# Patient Record
Sex: MALE | Race: WHITE
[De-identification: names, ages, dates, MRNs, and addresses within clinical notes are randomized per-mention and may not be internally consistent; named-entity substitution may affect disease eponyms.]

---

## 2018-06-09 ENCOUNTER — HOSPITAL ENCOUNTER (OUTPATIENT)
Dept: HOSPITAL 17 - NEPE | Age: 63
Setting detail: OBSERVATION
LOS: 2 days | Discharge: HOME | End: 2018-06-11
Attending: HOSPITALIST | Admitting: HOSPITALIST
Payer: MEDICAID

## 2018-06-09 VITALS
SYSTOLIC BLOOD PRESSURE: 189 MMHG | HEART RATE: 82 BPM | RESPIRATION RATE: 17 BRPM | DIASTOLIC BLOOD PRESSURE: 88 MMHG | OXYGEN SATURATION: 97 %

## 2018-06-09 VITALS — HEIGHT: 71 IN | BODY MASS INDEX: 44.1 KG/M2 | WEIGHT: 315 LBS

## 2018-06-09 VITALS
SYSTOLIC BLOOD PRESSURE: 161 MMHG | HEART RATE: 70 BPM | TEMPERATURE: 97.8 F | OXYGEN SATURATION: 95 % | RESPIRATION RATE: 19 BRPM | DIASTOLIC BLOOD PRESSURE: 83 MMHG

## 2018-06-09 VITALS
TEMPERATURE: 98.3 F | RESPIRATION RATE: 16 BRPM | HEART RATE: 83 BPM | OXYGEN SATURATION: 98 % | DIASTOLIC BLOOD PRESSURE: 76 MMHG | SYSTOLIC BLOOD PRESSURE: 141 MMHG

## 2018-06-09 VITALS
OXYGEN SATURATION: 95 % | HEART RATE: 77 BPM | RESPIRATION RATE: 20 BRPM | SYSTOLIC BLOOD PRESSURE: 162 MMHG | DIASTOLIC BLOOD PRESSURE: 87 MMHG

## 2018-06-09 DIAGNOSIS — E66.01: ICD-10-CM

## 2018-06-09 DIAGNOSIS — F12.90: ICD-10-CM

## 2018-06-09 DIAGNOSIS — M19.90: ICD-10-CM

## 2018-06-09 DIAGNOSIS — Z86.718: ICD-10-CM

## 2018-06-09 DIAGNOSIS — L03.115: Primary | ICD-10-CM

## 2018-06-09 DIAGNOSIS — I87.8: ICD-10-CM

## 2018-06-09 DIAGNOSIS — J98.11: ICD-10-CM

## 2018-06-09 DIAGNOSIS — I10: ICD-10-CM

## 2018-06-09 DIAGNOSIS — Z86.711: ICD-10-CM

## 2018-06-09 DIAGNOSIS — R60.0: ICD-10-CM

## 2018-06-09 DIAGNOSIS — L03.116: ICD-10-CM

## 2018-06-09 DIAGNOSIS — E11.9: ICD-10-CM

## 2018-06-09 DIAGNOSIS — Z59.0: ICD-10-CM

## 2018-06-09 DIAGNOSIS — R19.7: ICD-10-CM

## 2018-06-09 DIAGNOSIS — F41.9: ICD-10-CM

## 2018-06-09 DIAGNOSIS — Z79.01: ICD-10-CM

## 2018-06-09 DIAGNOSIS — R06.09: ICD-10-CM

## 2018-06-09 LAB
ALBUMIN SERPL-MCNC: 2.9 GM/DL (ref 3.4–5)
ALP SERPL-CCNC: 72 U/L (ref 45–117)
ALT SERPL-CCNC: 16 U/L (ref 12–78)
AST SERPL-CCNC: 35 U/L (ref 15–37)
BASOPHILS # BLD AUTO: 0 TH/MM3 (ref 0–0.2)
BASOPHILS NFR BLD: 0.6 % (ref 0–2)
BILIRUB SERPL-MCNC: 0.4 MG/DL (ref 0.2–1)
BUN SERPL-MCNC: 17 MG/DL (ref 7–18)
CALCIUM SERPL-MCNC: 8.3 MG/DL (ref 8.5–10.1)
CHLORIDE SERPL-SCNC: 110 MEQ/L (ref 98–107)
COLOR UR: YELLOW
CREAT SERPL-MCNC: 0.87 MG/DL (ref 0.6–1.3)
EOSINOPHIL # BLD: 0.3 TH/MM3 (ref 0–0.4)
EOSINOPHIL NFR BLD: 3.6 % (ref 0–4)
ERYTHROCYTE [DISTWIDTH] IN BLOOD BY AUTOMATED COUNT: 17.2 % (ref 11.6–17.2)
GFR SERPLBLD BASED ON 1.73 SQ M-ARVRAT: 90 ML/MIN (ref 89–?)
GLUCOSE SERPL-MCNC: 81 MG/DL (ref 74–106)
GLUCOSE UR STRIP-MCNC: (no result) MG/DL
HCO3 BLD-SCNC: 22.1 MEQ/L (ref 21–32)
HCT VFR BLD CALC: 38 % (ref 39–51)
HGB BLD-MCNC: 13.2 GM/DL (ref 13–17)
HGB UR QL STRIP: (no result)
KETONES UR STRIP-MCNC: (no result) MG/DL
LYMPHOCYTES # BLD AUTO: 1.2 TH/MM3 (ref 1–4.8)
LYMPHOCYTES NFR BLD AUTO: 17.1 % (ref 9–44)
MCH RBC QN AUTO: 30.1 PG (ref 27–34)
MCHC RBC AUTO-ENTMCNC: 34.7 % (ref 32–36)
MCV RBC AUTO: 86.8 FL (ref 80–100)
MONOCYTE #: 0.5 TH/MM3 (ref 0–0.9)
MONOCYTES NFR BLD: 6.6 % (ref 0–8)
MUCOUS THREADS #/AREA URNS LPF: (no result) /LPF
NEUTROPHILS # BLD AUTO: 5.1 TH/MM3 (ref 1.8–7.7)
NEUTROPHILS NFR BLD AUTO: 72.1 % (ref 16–70)
NITRITE UR QL STRIP: (no result)
PLATELET # BLD: 176 TH/MM3 (ref 150–450)
PMV BLD AUTO: 8.1 FL (ref 7–11)
PROT SERPL-MCNC: 7.3 GM/DL (ref 6.4–8.2)
RBC # BLD AUTO: 4.38 MIL/MM3 (ref 4.5–5.9)
SODIUM SERPL-SCNC: 140 MEQ/L (ref 136–145)
SP GR UR STRIP: 1.03 (ref 1–1.03)
SQUAMOUS #/AREA URNS HPF: <1 /HPF (ref 0–5)
URINE LEUKOCYTE ESTERASE: (no result)
WBC # BLD AUTO: 7.1 TH/MM3 (ref 4–11)

## 2018-06-09 PROCEDURE — 87493 C DIFF AMPLIFIED PROBE: CPT

## 2018-06-09 PROCEDURE — 80053 COMPREHEN METABOLIC PANEL: CPT

## 2018-06-09 PROCEDURE — G0378 HOSPITAL OBSERVATION PER HR: HCPCS

## 2018-06-09 PROCEDURE — 96376 TX/PRO/DX INJ SAME DRUG ADON: CPT

## 2018-06-09 PROCEDURE — 85025 COMPLETE CBC W/AUTO DIFF WBC: CPT

## 2018-06-09 PROCEDURE — 93306 TTE W/DOPPLER COMPLETE: CPT

## 2018-06-09 PROCEDURE — 81001 URINALYSIS AUTO W/SCOPE: CPT

## 2018-06-09 PROCEDURE — 71045 X-RAY EXAM CHEST 1 VIEW: CPT

## 2018-06-09 PROCEDURE — 96375 TX/PRO/DX INJ NEW DRUG ADDON: CPT

## 2018-06-09 PROCEDURE — 87040 BLOOD CULTURE FOR BACTERIA: CPT

## 2018-06-09 PROCEDURE — 83880 ASSAY OF NATRIURETIC PEPTIDE: CPT

## 2018-06-09 PROCEDURE — 83605 ASSAY OF LACTIC ACID: CPT

## 2018-06-09 PROCEDURE — 99285 EMERGENCY DEPT VISIT HI MDM: CPT

## 2018-06-09 PROCEDURE — 96365 THER/PROPH/DIAG IV INF INIT: CPT

## 2018-06-09 RX ADMIN — Medication SCH ML: at 20:19

## 2018-06-09 RX ADMIN — HYDROCODONE BITARTRATE AND ACETAMINOPHEN PRN TAB: 10; 325 TABLET ORAL at 22:35

## 2018-06-09 SDOH — ECONOMIC STABILITY - HOUSING INSECURITY: HOMELESSNESS: Z59.0

## 2018-06-09 NOTE — RADRPT
EXAM DATE:  6/9/2018 8:10 PM EDT

AGE/SEX:        62 years / Male



INDICATIONS:  SOB. Swelling bi-laterally to lower extremities.



CLINICAL DATA:  This is the patient's initial encounter. Patient reports that signs and symptoms have
 been present for 3 days and indicates a pain score of 2/10. 

                                                                          

MEDICAL/SURGICAL HISTORY:       None. None.



COMPARISON:      [facility], CHEST SINGLE AP, 10/4/2016.  . 





FINDINGS:  

Mild basilar density most characteristic of atelectasis. No effusion. No pneumothorax. Heart size enl
arged.



CONCLUSION: 

Mild basilar atelectasis. Cardiomegaly.



Electronically signed by: Ger Medina MD  6/9/2018 8:14 PM EDT

## 2018-06-09 NOTE — HHI.HP
__________________________________________________





HPI


Service


[facility]


Primary Care Physician


Unknown


Admission Diagnosis





Lower extremity cellulitis, chronic venous stasis, DVT


Diagnoses:  


Chief Complaint:  


"I was unable to walk anymore".


Travel History


International Travel<30 Days:  No


Contact w/Intl Traveler <30 Da:  No


Traveled to Known Affected Are:  No


History of Present Illness


This is a 62-year-old male with past medical history as stated below with 

morbid obesity, diabetes type 2, history of DVT and PE who presented to [facility] emergency department via EMS for bilateral lower extremity edema 

and inability to ambulate.  The patient states that he has had increased edema 

for the past week, denies any history of chronic venous insufficiency.  Patient 

states that he has had increased redness, swelling, erythema and warmth in 

bilateral lower extremities but more pronounced in the right lower extremity.  

The patient states he is currently homeless and has no family in the area.  The 

patient denies chest pain however he does complain of shortness of breath on 

exertion.  Patient has been complaining of intermittent episodes of diarrhea in 

the last 3 days.  States he was prescribed antibiotics for his legs 4 weeks ago 

and he finished taking antibiotics for which he does not remember the name 2 

weeks ago.  He denies any fevers, chills, sweats, cough.





Review of Systems


As per HPI, other systems reviewed by me and negative.





Past Family Social History


Past Medical History


Hypertension


Anxiety


diabetes


?Asthma vs. COPD


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


History of PE in 2015


Arthritis


Chronic lower extremity swelling


Past Surgical History


Tonsillectomy


Clavicle surgery


Right knee surgery


Hernia surgery


Reported Medications





Bactrim DS (Sulfamethoxazole-Trimethoprim) 800-160 Mg Tab 1 Tab PO BID


Allergies:  


Coded Allergies:  


     Fish Containing Products (Unverified  Allergy, Severe, Anaphylaxis, 18)


     metformin (Unverified  Allergy, Severe, 18)


 sts shuts kidneys down


     pregabalin (Unverified  Allergy, Severe, 18)


 shuts kidneys down


     tramadol (Unverified  Allergy, Severe, 18)


 itch and thoat tightens up


     *MDRO Multi-Drug Resistant Organism (Verified  Adverse Reaction, Unknown, )


 MRSA PCR Screen negative 4/22/15.


Active Ordered Medications





Current Medications








 Medications


  (Trade)  Dose


 Ordered  Sig/Noe


 Route  Start Time


 Stop Time Status Last Admin


 


 Cefazolin Sodium/


 Dextrose  50 ml @ 


 100 mls/hr  ONCE  ONCE


 IV  18 18:15


 18 18:44   


 








Family History


Father: multiple medical issues, including DVTs, PEs, MI, CVA, gout. Passed 

away at age 77 


Mother: CVA; passed away at age 72 from brain cancer


Social History


Tobacco: smoked from 2 years from age [age] yo


EtOH: only once or twice a year


Illicit drugs: occasional marijuana. Smoked yesterday


Patient is currently homeless and is on disability





Physical Exam


Vital Signs





Vital Signs








  Date Time  Temp Pulse Resp B/P (MAP) Pulse Ox O2 Delivery O2 Flow Rate FiO2


 


18 13:04 98.3 83 16 141/76 (97) 98   








Physical Exam


GENERAL: This is a well-nourished, well-developed patient, in no apparent 

distress. morbidly obese.


SKIN: No rashes, ecchymoses or lesions. Cool and dry.


HEAD: Atraumatic. Normocephalic. No temporal or scalp tenderness.


EYES: Pupils equal round and reactive. Extraocular motions intact. No scleral 

icterus. No injection or drainage. 


ENT: Nose without bleeding, purulent drainage or septal hematoma. Throat 

without erythema, tonsillar hypertrophy or exudate. Uvula midline. Airway 

patent.


NECK: Trachea midline. No JVD or lymphadenopathy. Supple, nontender, no 

meningeal signs.


CARDIOVASCULAR: Regular rate and rhythm without murmurs, gallops, or rubs. 


RESPIRATORY: Clear to auscultation. Breath sounds equal bilaterally. No wheezes

, rales, or rhonchi.  


GASTROINTESTINAL: Abdomen soft, non-tender, nondistended. No hepato-splenomegaly

, or palpable masses. No guarding. obese.


MUSCULOSKELETAL: Extremities without clubbing, cyanosis, or edema. No joint 

tenderness, effusion, or edema noted. No calf tenderness. Negative Homans sign 

bilaterally.


NEUROLOGICAL: Awake and alert. Cranial nerves II through XII intact.  Motor and 

sensory grossly within normal limits. Five out of 5 muscle strength in all 

muscle groups.  Normal speech.


Laboratory





Laboratory Tests








Test


  18


13:30


 


White Blood Count 7.1 


 


Red Blood Count 4.38 


 


Hemoglobin 13.2 


 


Hematocrit 38.0 


 


Mean Corpuscular Volume 86.8 


 


Mean Corpuscular Hemoglobin 30.1 


 


Mean Corpuscular Hemoglobin


Concent 34.7 


 


 


Red Cell Distribution Width 17.2 


 


Platelet Count 176 


 


Mean Platelet Volume 8.1 


 


Neutrophils (%) (Auto) 72.1 


 


Lymphocytes (%) (Auto) 17.1 


 


Monocytes (%) (Auto) 6.6 


 


Eosinophils (%) (Auto) 3.6 


 


Basophils (%) (Auto) 0.6 


 


Neutrophils # (Auto) 5.1 


 


Lymphocytes # (Auto) 1.2 


 


Monocytes # (Auto) 0.5 


 


Eosinophils # (Auto) 0.3 


 


Basophils # (Auto) 0.0 


 


CBC Comment DIFF FINAL 


 


Differential Comment  


 


Blood Urea Nitrogen 17 


 


Creatinine 0.87 


 


Random Glucose 81 


 


Total Protein 7.3 


 


Albumin 2.9 


 


Calcium Level 8.3 


 


Alkaline Phosphatase 72 


 


Aspartate Amino Transf


(AST/SGOT) 35 


 


 


Alanine Aminotransferase


(ALT/SGPT) 16 


 


 


Total Bilirubin 0.4 


 


Sodium Level 140 


 


Potassium Level 5.2 


 


Chloride Level 110 


 


Carbon Dioxide Level 22.1 


 


Anion Gap 8 


 


Estimat Glomerular Filtration


Rate 90 


 


 


Lactic Acid Level 1.3 














 Date/Time


Source Procedure


Growth Status


 


 


 18 13:30


Blood Peripheral Aerobic Blood Culture


Pending Received


 


 18 13:30


Blood Peripheral Anaerobic Blood Culture


Pending Received








Result Diagram:  


18 1330                                                                    

            18 1330








Caprini VTE Risk Assessment


Caprini VTE Risk Assessment:  Mod/High Risk (score >= 2)


Caprini Risk Assessment Model











 Point Value = 1          Point Value = 2  Point Value = 3  Point Value = 5


 


Age 41-60


Minor surgery


BMI > 25 kg/m2


Swollen legs


Varicose veins


Pregnancy or postpartum


History of unexplained or recurrent


   spontaneous 


Oral contraceptives or hormone


   replacement


Sepsis (< 1 month)


Serious lung disease, including


   pneumonia (< 1 month)


Abnormal pulmonary function


Acute myocardial infarction


Congestive heart failure (< 1 month)


History of inflammatory bowel disease


Medical patient at bed rest Age 61-74


Arthroscopic surgery


Major open surgery (> 45 min)


Laparoscopic surgery (> 45 min)


Malignancy


Confined to bed (> 72 hours)


Immobilizing plaster cast


Central venous access Age >= 75


History of VTE


Family history of VTE


Factor V Leiden


Prothrombin [id number]


Lupus anticoagulant


Anticardiolipin antibodies


Elevated serum homocysteine


Heparin-induced thrombocytopenia


Other congenital or acquired


   thrombophilia Stroke (< 1 month)


Elective arthroplasty


Hip, pelvis, or leg fracture


Acute spinal cord injury (< 1 month)








Prophylaxis Regimen











   Total Risk


Factor Score Risk Level Prophylaxis Regimen


 


0-1      Low Early ambulation


 


2 Moderate Order ONE of the following:


*Sequential Compression Device (SCD)


*Heparin 5000 units SQ BID


 


3-4 Higher Order ONE of the following medications:


*Heparin 5000 units SQ TID


*Enoxaparin/Lovenox 40 mg SQ daily (WT < 150 kg, CrCl > 30 mL/min)


*Enoxaparin/Lovenox 30 mg SQ daily (WT < 150 kg, CrCl > 10-29 mL/min)


*Enoxaparin/Lovenox 30 mg SQ BID (WT < 150 kg, CrCl > 30 mL/min)


AND/OR


*Sequential Compression Device (SCD)


 


5 or more Highest Order ONE of the following medications:


*Heparin 5000 units SQ TID (Preferred with Epidurals)


*Enoxaparin/Lovenox 40 mg SQ daily (WT < 150 kg, CrCl > 30 mL/min)


*Enoxaparin/Lovenox 30 mg SQ daily (WT < 150 kg, CrCl > 10-29 mL/min)


*Enoxaparin/Lovenox 30 mg SQ BID (WT < 150 kg, CrCl > 30 mL/min)


AND


*Sequential Compression Device (SCD)











Assessment and Plan


Problem List:  


(1) Cellulitis of both lower extremities


ICD Code:  L03.115 - Cellulitis of right lower limb; L03.116 - Cellulitis of 

left lower limb


(2) Dyspnea on exertion


ICD Code:  R06.09 - Dyspnea on exertion


Status:  Acute


(3) Bilateral leg edema


ICD Code:  R60.0 - Localized edema


Assessment and Plan


Admit the patient to the medical floor.


The patient presents with extensive bilateral lower extremity edema with 

cellulitis.


The patient was given IV cefazolin emergency department.  Will continue.


We will start the patient on Lasix 40 mg p.o. twice daily.  Suspect possibly 

patient has some degree of congestive heart failure since patient is 

complaining of dyspnea on exertion and there is worsening bilateral lower 

extremity edema.  Check chest x-ray.


Check 2D echocardiogram.


Check C. difficile toxin PCR for reported diarrhea.


Follow-up blood cultures obtained in the emergency department.


Continue chronic anti-correlation with Xarelto for history of DVT and PE.


Consult ID for bilateral extremity cellulitis.


Ace wraps to bilateral extremities and leg elevation to help improve edema.


Start the patient on clonidine as needed for uncontrolled hypertension with 

systolic blood pressure in the 190s.


Code Status


Full code


Discussed Condition With


ED physician, patient.











Benja Paige MD 2018 18:44

## 2018-06-10 VITALS
DIASTOLIC BLOOD PRESSURE: 57 MMHG | SYSTOLIC BLOOD PRESSURE: 116 MMHG | HEART RATE: 73 BPM | TEMPERATURE: 98.8 F | OXYGEN SATURATION: 94 % | RESPIRATION RATE: 17 BRPM

## 2018-06-10 VITALS
OXYGEN SATURATION: 93 % | RESPIRATION RATE: 20 BRPM | HEART RATE: 71 BPM | DIASTOLIC BLOOD PRESSURE: 76 MMHG | TEMPERATURE: 98 F | SYSTOLIC BLOOD PRESSURE: 163 MMHG

## 2018-06-10 VITALS
SYSTOLIC BLOOD PRESSURE: 130 MMHG | RESPIRATION RATE: 19 BRPM | TEMPERATURE: 97.9 F | DIASTOLIC BLOOD PRESSURE: 70 MMHG | OXYGEN SATURATION: 93 % | HEART RATE: 77 BPM

## 2018-06-10 VITALS
TEMPERATURE: 97.8 F | HEART RATE: 66 BPM | RESPIRATION RATE: 20 BRPM | OXYGEN SATURATION: 94 % | SYSTOLIC BLOOD PRESSURE: 147 MMHG | DIASTOLIC BLOOD PRESSURE: 79 MMHG

## 2018-06-10 VITALS
DIASTOLIC BLOOD PRESSURE: 88 MMHG | OXYGEN SATURATION: 94 % | RESPIRATION RATE: 19 BRPM | HEART RATE: 68 BPM | SYSTOLIC BLOOD PRESSURE: 167 MMHG | TEMPERATURE: 97.8 F

## 2018-06-10 VITALS
RESPIRATION RATE: 20 BRPM | SYSTOLIC BLOOD PRESSURE: 154 MMHG | HEART RATE: 71 BPM | OXYGEN SATURATION: 95 % | DIASTOLIC BLOOD PRESSURE: 72 MMHG | TEMPERATURE: 97.8 F

## 2018-06-10 VITALS — SYSTOLIC BLOOD PRESSURE: 156 MMHG | DIASTOLIC BLOOD PRESSURE: 88 MMHG

## 2018-06-10 LAB
ALBUMIN SERPL-MCNC: 2.7 GM/DL (ref 3.4–5)
ALP SERPL-CCNC: 65 U/L (ref 45–117)
ALT SERPL-CCNC: 14 U/L (ref 12–78)
AST SERPL-CCNC: 13 U/L (ref 15–37)
BASOPHILS # BLD AUTO: 0 TH/MM3 (ref 0–0.2)
BASOPHILS NFR BLD: 0.6 % (ref 0–2)
BILIRUB SERPL-MCNC: 0.5 MG/DL (ref 0.2–1)
BUN SERPL-MCNC: 14 MG/DL (ref 7–18)
CALCIUM SERPL-MCNC: 8.2 MG/DL (ref 8.5–10.1)
CHLORIDE SERPL-SCNC: 109 MEQ/L (ref 98–107)
CREAT SERPL-MCNC: 0.97 MG/DL (ref 0.6–1.3)
EOSINOPHIL # BLD: 0.4 TH/MM3 (ref 0–0.4)
EOSINOPHIL NFR BLD: 6.1 % (ref 0–4)
ERYTHROCYTE [DISTWIDTH] IN BLOOD BY AUTOMATED COUNT: 17.6 % (ref 11.6–17.2)
GFR SERPLBLD BASED ON 1.73 SQ M-ARVRAT: 79 ML/MIN (ref 89–?)
GLUCOSE SERPL-MCNC: 71 MG/DL (ref 74–106)
HCO3 BLD-SCNC: 26.3 MEQ/L (ref 21–32)
HCT VFR BLD CALC: 37.4 % (ref 39–51)
HGB BLD-MCNC: 12.3 GM/DL (ref 13–17)
LYMPHOCYTES # BLD AUTO: 1.7 TH/MM3 (ref 1–4.8)
LYMPHOCYTES NFR BLD AUTO: 28.4 % (ref 9–44)
MCH RBC QN AUTO: 28.8 PG (ref 27–34)
MCHC RBC AUTO-ENTMCNC: 32.9 % (ref 32–36)
MCV RBC AUTO: 87.7 FL (ref 80–100)
MONOCYTE #: 0.5 TH/MM3 (ref 0–0.9)
MONOCYTES NFR BLD: 8.6 % (ref 0–8)
NEUTROPHILS # BLD AUTO: 3.3 TH/MM3 (ref 1.8–7.7)
NEUTROPHILS NFR BLD AUTO: 56.3 % (ref 16–70)
PLATELET # BLD: 172 TH/MM3 (ref 150–450)
PMV BLD AUTO: 7.7 FL (ref 7–11)
PROT SERPL-MCNC: 6.5 GM/DL (ref 6.4–8.2)
RBC # BLD AUTO: 4.27 MIL/MM3 (ref 4.5–5.9)
SODIUM SERPL-SCNC: 142 MEQ/L (ref 136–145)
WBC # BLD AUTO: 5.8 TH/MM3 (ref 4–11)

## 2018-06-10 RX ADMIN — Medication SCH ML: at 09:02

## 2018-06-10 RX ADMIN — HYDROCODONE BITARTRATE AND ACETAMINOPHEN PRN TAB: 10; 325 TABLET ORAL at 09:04

## 2018-06-10 RX ADMIN — HYDROCODONE BITARTRATE AND ACETAMINOPHEN PRN TAB: 10; 325 TABLET ORAL at 17:06

## 2018-06-10 RX ADMIN — GABAPENTIN SCH MG: 400 CAPSULE ORAL at 09:02

## 2018-06-10 RX ADMIN — SERTRALINE HYDROCHLORIDE SCH MG: 100 TABLET, FILM COATED ORAL at 09:02

## 2018-06-10 RX ADMIN — FUROSEMIDE SCH MG: 40 TABLET ORAL at 17:06

## 2018-06-10 RX ADMIN — HYDROCODONE BITARTRATE AND ACETAMINOPHEN PRN TAB: 10; 325 TABLET ORAL at 21:03

## 2018-06-10 RX ADMIN — NIFEDIPINE SCH MG: 60 TABLET, FILM COATED, EXTENDED RELEASE ORAL at 12:26

## 2018-06-10 RX ADMIN — GABAPENTIN SCH MG: 400 CAPSULE ORAL at 17:06

## 2018-06-10 RX ADMIN — Medication SCH TAB: at 21:02

## 2018-06-10 RX ADMIN — GABAPENTIN SCH MG: 400 CAPSULE ORAL at 12:26

## 2018-06-10 RX ADMIN — Medication SCH ML: at 21:03

## 2018-06-10 RX ADMIN — FUROSEMIDE SCH MG: 40 TABLET ORAL at 09:02

## 2018-06-10 RX ADMIN — RIVAROXABAN SCH MG: 20 TABLET, FILM COATED ORAL at 09:02

## 2018-06-10 NOTE — PD.ID.CON
History of Present Illness


Service


ID


Consult Requested By


Dr Munoz


Reason for Consult


BLE cellulitis


Primary Care Physician


Unknown


Diagnoses:  


History of Present Illness


61 yo morbidly obese male with chronic venostasis and prior episodes of BLE 

cellulitis presents with few days of worserning edema, erythema of BLE


On presentation no fever, no leukocytosis


Started on Cefazolin


Blood clx are negative


He also c/o diarrhea on presentation, whic resolved


C.diff test negative





Review of Systems


Except as stated in HPI:  all other systems reviewed are Neg





Past Family Social History


Allergies:  


Coded Allergies:  


     Fish Containing Products (Unverified  Allergy, Severe, Anaphylaxis, 6/9/18)


     metformin (Unverified  Allergy, Severe, 6/9/18)


 sts shuts kidneys down


     pregabalin (Unverified  Allergy, Severe, 6/9/18)


 shuts kidneys down


     tramadol (Unverified  Allergy, Severe, 6/9/18)


 itch and thoat tightens up


     *MDRO Multi-Drug Resistant Organism (Verified  Adverse Reaction, Unknown, 6 /9/18)


 MRSA PCR Screen negative 4/22/15.


Past Medical History


Hypertension


Anxiety


diabetes


?Asthma vs. COPD


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


History of PE in March 2015


Arthritis


Chronic lower extremity swelling


Past Surgical History


 Tonsillectomy


Clavicle surgery


Right knee surgery


Hernia surgery


Active Ordered Medications


Medications where reviewed in EMR


Antibiotics Include:  cefazolin 1 gm q 8


Family History


 


Father: multiple medical issues, including DVTs, PEs, MI, CVA, gout. Passed 

away at age 77 


Mother: CVA; passed away at age 72 from brain cancer


Social History


 


Tobacco: smoked x  2 years from age [age] yo


EtOH: only once or twice a year


Illicit drugs: occasional marijuana. Smoked yesterday


Patient is currently homeless and is on disability





Physical Exam


Vital Signs





Vital Signs








  Date Time  Temp Pulse Resp B/P (MAP) Pulse Ox O2 Delivery O2 Flow Rate FiO2


 


6/10/18 16:00 97.8 71 20 154/72 (99) 95   


 


6/10/18 16:00      Room Air  


 


6/10/18 12:00      Room Air  


 


6/10/18 12:00 97.8 66 20 147/79 (101) 94   


 


6/10/18 08:00      Room Air  


 


6/10/18 08:00 98.0 71 20 163/76 (105) 93   


 


6/10/18 05:30    156/88 (110)    


 


6/10/18 04:00 97.8 68 19 167/88 (114) 94   


 


6/10/18 01:12   18     


 


6/10/18 00:00 97.9 77 19 130/70 (90) 93   


 


6/9/18 20:21  77 20 162/87 (112) 95 Room Air  








Physical Exam


CONSTITUTIONAL/GENERAL: This is a morbidly obese patient, in no apparent 

distress.


TUBES/LINES/DRAINS:


SKIN: No jaundice, rashes, or lesions. Ecchymoses on upper extremities. No 

wounds seen anteriorly. Skin temperature appropriate. Not diaphoretic. 


HEAD: Atraumatic. Normocephalic.


EYES: Pupils equal and round and reactive. Extraocular motions intact. No 

scleral icterus. No injection or drainage. Fundi not examined.


ENT: Hearing grossly normal. Nose without bleeding or purulent drainage. Throat 

without visible erythema, exudates, masses, or lesions. Poor dentition


NECK: Trachea midline. Supple, nontender.  


CARDIOVASCULAR: Regular rate and rhythm without murmurs, gallops, or rubs. No 

JVD. Peripheral pulses symmetric.


RESPIRATORY/CHEST: Symmetric, unlabored respirations. Clear to auscultation. 

Breath sounds equal bilaterally. No wheezes, rales, or rhonchi.  


GASTROINTESTINAL: Abdomen soft, non-tender, nondistended. No hepato-splenomegaly

, or palpable masses. No guarding. Bowel sounds present.


GENITOURINARY: Without palpable bladder distension. Holguin catheter in place.


MUSCULOSKELETAL: Extremities without clubbing, cyanosis, 


Massibve 3+ edema., though tree bark marks present cw diminishiunfg swelling


+ erythema


+ chronic hyperpigmentation and hyperkeratosis


 No joint tenderness or effusion noted. No calf tenderness. No mottling or 

clubbing.


LYMPHATICS: No palpable cervical or supraclavicular adenopathy.


NEUROLOGICAL: Awake and alert. Motor and sensory grossly within normal limits. 

Follows commands. Clear speech . Moves all extremities.


PSYCHIATRIC: No obvious anxiety/depression. no apparent hallucinations or other 

psychotic thought process.


Laboratory





Laboratory Tests








Test


  6/9/18


20:32 6/9/18


23:20 6/10/18


05:08


 


Urine Color YELLOW   


 


Urine Turbidity CLEAR   


 


Urine pH 6.5   


 


Urine Specific Gravity 1.028   


 


Urine Protein TRACE   


 


Urine Glucose (UA) NEG   


 


Urine Ketones NEG   


 


Urine Occult Blood NEG   


 


Urine Nitrite NEG   


 


Urine Bilirubin NEG   


 


Urine Urobilinogen LESS THAN 2.0   


 


Urine Leukocyte Esterase NEG   


 


Urine RBC LESS THAN 1   


 


Urine WBC 2   


 


Urine Squamous Epithelial


Cells <1 


  


  


 


 


Urine Mucus MOD   


 


Microscopic Urinalysis Comment


  CULT NOT


INDICATED 


  


 


 


Stool C. difficile Toxin (PCR)  NEGATIVE  


 


Stl C. difficile Toxin


Epiderm 027 


  PRESUMPTIVE


NEGATIVE 


 


 


White Blood Count   5.8 


 


Red Blood Count   4.27 


 


Hemoglobin   12.3 


 


Hematocrit   37.4 


 


Mean Corpuscular Volume   87.7 


 


Mean Corpuscular Hemoglobin   28.8 


 


Mean Corpuscular Hemoglobin


Concent 


  


  32.9 


 


 


Red Cell Distribution Width   17.6 


 


Platelet Count   172 


 


Mean Platelet Volume   7.7 


 


Neutrophils (%) (Auto)   56.3 


 


Lymphocytes (%) (Auto)   28.4 


 


Monocytes (%) (Auto)   8.6 


 


Eosinophils (%) (Auto)   6.1 


 


Basophils (%) (Auto)   0.6 


 


Neutrophils # (Auto)   3.3 


 


Lymphocytes # (Auto)   1.7 


 


Monocytes # (Auto)   0.5 


 


Eosinophils # (Auto)   0.4 


 


Basophils # (Auto)   0.0 


 


CBC Comment   DIFF FINAL 


 


Differential Comment    


 


Blood Urea Nitrogen   14 


 


Creatinine   0.97 


 


Random Glucose   71 


 


Total Protein   6.5 


 


Albumin   2.7 


 


Calcium Level   8.2 


 


Alkaline Phosphatase   65 


 


Aspartate Amino Transf


(AST/SGOT) 


  


  13 


 


 


Alanine Aminotransferase


(ALT/SGPT) 


  


  14 


 


 


Total Bilirubin   0.5 


 


Sodium Level   142 


 


Potassium Level   4.1 


 


Chloride Level   109 


 


Carbon Dioxide Level   26.3 


 


Anion Gap   7 


 


Estimat Glomerular Filtration


Rate 


  


  79 


 


 


B-Type Natriuretic Peptide   120 














 Date/Time


Source Procedure


Growth Status


 


 


 6/9/18 13:30


Blood Peripheral Aerobic Blood Culture - Preliminary


NO GROWTH IN 1 DAY Resulted


 


 6/9/18 13:30


Blood Peripheral Anaerobic Blood Culture - Preliminary


NO GROWTH IN 1 DAY Resulted








Result Diagram:  


6/10/18 0508                                                                   

             6/10/18 0508





Imaging





Last Impressions








Chest X-Ray 6/9/18 0000 Signed





Impressions: 





 CONCLUSION: 





 Mild basilar atelectasis. Cardiomegaly.





  





 











Assessment and Plan


Assessment and Plan


Morbid obesity


Chronic venostasis


BLE cellulitis





   - change Ancef to 2 gm  q 8


Keep BLE elevated


Anticipate transition to oral abx once get Roberta Rene MD Evan 10, 2018 20:26

## 2018-06-10 NOTE — HHI.PR
Subjective


Remarks


Follow-up chronic venous stasis versus bilateral lower extremity cellulitis/

dyspnea


Fiona 10, 2018-patient seen and examined, reports some improvement of diarrheal 

episodes since admission.  Denies any significant shortness of breath.  

Currently afebrile.





Objective


Vitals





Vital Signs








  Date Time  Temp Pulse Resp B/P (MAP) Pulse Ox O2 Delivery O2 Flow Rate FiO2


 


6/10/18 08:00      Room Air  


 


6/10/18 08:00 98.0 71 20 163/76 (105) 93   


 


6/10/18 05:30    156/88 (110)    


 


6/10/18 04:00 97.8 68 19 167/88 (114) 94   


 


6/10/18 01:12   18     


 


6/10/18 00:00 97.9 77 19 130/70 (90) 93   


 


6/9/18 20:21  77 20 162/87 (112) 95 Room Air  


 


6/9/18 18:50  82 17 189/88 (121) 97 Room Air  


 


6/9/18 13:04 98.3 83 16 141/76 (97) 98   














I/O      


 


 6/9/18 6/9/18 6/9/18 6/10/18 6/10/18 6/10/18





 07:00 15:00 23:00 07:00 15:00 23:00


 


Intake Total    500 ml  


 


Output Total    1000 ml  


 


Balance    -500 ml  


 


      


 


Intake Oral    400 ml  


 


IV Total    100 ml  


 


Output Urine Total    1000 ml  


 


# Bowel Movements    3  








Result Diagram:  


6/10/18 0508                                                                   

             6/10/18 0508





Imaging





Last Impressions








Chest X-Ray 6/9/18 0000 Signed





Impressions: 





 CONCLUSION: 





 Mild basilar atelectasis. Cardiomegaly.





  





 








Objective Remarks


GENERAL: NAD


SKIN: Warm and dry.


HEAD: Normocephalic.


EYES: No scleral icterus. No injection or drainage. 


NECK: Supple, trachea midline. No JVD or lymphadenopathy.


CARDIOVASCULAR: Regular rate and rhythm without murmurs, gallops, or rubs. 


RESPIRATORY: Breath sounds equal bilaterally. No accessory muscle use.


GASTROINTESTINAL: Abdomen soft, non-tender, nondistended. 


MUSCULOSKELETAL: No cyanosis; chronic venous stasis with some erythema


BACK: Nontender without obvious deformity. No CVA tenderness.








A/P


Problem List:  


(1) Cellulitis of both lower extremities


ICD Code:  L03.115 - Cellulitis of right lower limb; L03.116 - Cellulitis of 

left lower limb


(2) Dyspnea on exertion


ICD Code:  R06.09 - Dyspnea on exertion


Status:  Acute


(3) Bilateral leg edema


ICD Code:  R60.0 - Localized edema


Assessment and Plan


72-year-old man with





Chronic venous stasis versus cellulitis of both lower extremities


   Appears to be likely chronic venous stasis which can mimic itself for 

cellulitis


   However currently on IV antibiotic pending evaluation from ID


   Continue Ace wrap, elevate lower extremities





Dyspnea


   Chest x-ray with evidence of cardiomegaly


   2D echo pending and check BNP





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


   Continue with Xarelto





Questionable history of CHF


   Currently on Lasix twice daily, ordered by admitting doctor


   2D echo pending and check BNP





Benign labile hypertension


   Start Procardia XL





Diarrhea


   Improving


   C. difficile PCR negative


   Treat with antidiarrhea motility agents





DVT prophylaxis: Lan Lauren MD Evan 10, 2018 10:43

## 2018-06-11 VITALS
TEMPERATURE: 98 F | RESPIRATION RATE: 18 BRPM | DIASTOLIC BLOOD PRESSURE: 87 MMHG | SYSTOLIC BLOOD PRESSURE: 122 MMHG | HEART RATE: 71 BPM | OXYGEN SATURATION: 95 %

## 2018-06-11 VITALS
SYSTOLIC BLOOD PRESSURE: 118 MMHG | DIASTOLIC BLOOD PRESSURE: 78 MMHG | RESPIRATION RATE: 19 BRPM | HEART RATE: 71 BPM | OXYGEN SATURATION: 95 % | TEMPERATURE: 98.3 F

## 2018-06-11 VITALS
OXYGEN SATURATION: 94 % | HEART RATE: 67 BPM | RESPIRATION RATE: 19 BRPM | DIASTOLIC BLOOD PRESSURE: 70 MMHG | SYSTOLIC BLOOD PRESSURE: 130 MMHG | TEMPERATURE: 98.1 F

## 2018-06-11 VITALS
OXYGEN SATURATION: 93 % | DIASTOLIC BLOOD PRESSURE: 77 MMHG | HEART RATE: 74 BPM | SYSTOLIC BLOOD PRESSURE: 144 MMHG | TEMPERATURE: 99.4 F | RESPIRATION RATE: 18 BRPM

## 2018-06-11 VITALS
HEART RATE: 77 BPM | OXYGEN SATURATION: 95 % | DIASTOLIC BLOOD PRESSURE: 80 MMHG | SYSTOLIC BLOOD PRESSURE: 130 MMHG | RESPIRATION RATE: 18 BRPM | TEMPERATURE: 98 F

## 2018-06-11 RX ADMIN — Medication SCH ML: at 09:18

## 2018-06-11 RX ADMIN — GABAPENTIN SCH MG: 400 CAPSULE ORAL at 09:05

## 2018-06-11 RX ADMIN — SERTRALINE HYDROCHLORIDE SCH MG: 100 TABLET, FILM COATED ORAL at 09:04

## 2018-06-11 RX ADMIN — HYDROCODONE BITARTRATE AND ACETAMINOPHEN PRN TAB: 10; 325 TABLET ORAL at 09:04

## 2018-06-11 RX ADMIN — GABAPENTIN SCH MG: 400 CAPSULE ORAL at 17:34

## 2018-06-11 RX ADMIN — NIFEDIPINE SCH MG: 60 TABLET, FILM COATED, EXTENDED RELEASE ORAL at 09:04

## 2018-06-11 RX ADMIN — Medication SCH TAB: at 09:04

## 2018-06-11 RX ADMIN — GABAPENTIN SCH MG: 400 CAPSULE ORAL at 15:18

## 2018-06-11 RX ADMIN — FUROSEMIDE SCH MG: 40 TABLET ORAL at 17:35

## 2018-06-11 RX ADMIN — FUROSEMIDE SCH MG: 40 TABLET ORAL at 09:05

## 2018-06-11 RX ADMIN — HYDROCODONE BITARTRATE AND ACETAMINOPHEN PRN TAB: 10; 325 TABLET ORAL at 15:17

## 2018-06-11 RX ADMIN — RIVAROXABAN SCH MG: 20 TABLET, FILM COATED ORAL at 09:05

## 2018-06-11 NOTE — HHI.PR
Subjective


Remarks


Follow-up chronic venous stasis versus bilateral lower extremity cellulitis/

dyspnea


Fiona 10, 2018-patient seen and examined, reports some improvement of diarrheal 

episodes since admission.  Denies any significant shortness of breath.  

Currently afebrile.


June 11, 2018: No fever or chills overnight.  Diarrhea however is improving.  

No nausea or vomiting.  Eating okay.  Wounds are healing fairly well.





Objective


Vitals





Vital Signs








  Date Time  Temp Pulse Resp B/P (MAP) Pulse Ox O2 Delivery O2 Flow Rate FiO2


 


6/11/18 08:00 99.4 74 18 144/77 (99) 93   


 


6/11/18 04:00 98.3 71 19 118/78 (91) 95   


 


6/11/18 04:00      Room Air  


 


6/11/18 00:00      Room Air  


 


6/11/18 00:00 98.1 67 19 130/70 (90) 94   


 


6/10/18 23:42   18     


 


6/10/18 21:00      Room Air  


 


6/10/18 20:00 98.8 73 17 116/57 (76) 94   


 


6/10/18 16:00 97.8 71 20 154/72 (99) 95   


 


6/10/18 16:00      Room Air  


 


6/10/18 12:00      Room Air  


 


6/10/18 12:00 97.8 66 20 147/79 (101) 94   














I/O      


 


 6/10/18 6/10/18 6/10/18 6/11/18 6/11/18 6/11/18





 06:59 14:59 22:59 06:59 14:59 22:59


 


Intake Total 500 ml  860 ml 340 ml  


 


Output Total 1000 ml  700 ml 2300 ml  


 


Balance -500 ml  160 ml -1960 ml  


 


      


 


Intake Oral 400 ml  860 ml 240 ml  


 


IV Total 100 ml   100 ml  


 


Output Urine Total 1000 ml  700 ml 2300 ml  


 


# Bowel Movements 3  0 0  








Result Diagram:  


6/10/18 0508                                                                   

             6/10/18 0508





Imaging





Last Impressions








Chest X-Ray 6/9/18 0000 Signed





Impressions: 





 CONCLUSION: 





 Mild basilar atelectasis. Cardiomegaly.





  





 








Objective Remarks


GENERAL: 61 yo male, appears in NAD


CARDIOVASCULAR: Regular rate and rhythm without murmurs, gallops, or rubs. 


RESPIRATORY: Breath sounds equal bilaterally. No accessory muscle use.


GASTROINTESTINAL: Abdomen soft, non-tender, nondistended. 


MUSCULOSKELETAL: No cyanosis; chronic venous stasis with some erythema


BACK: Nontender without obvious deformity. No CVA tenderness.











A/P


Problem List:  


(1) Cellulitis of both lower extremities


ICD Code:  L03.115 - Cellulitis of right lower limb; L03.116 - Cellulitis of 

left lower limb


(2) Dyspnea on exertion


ICD Code:  R06.09 - Dyspnea on exertion


Status:  Acute


(3) Bilateral leg edema


ICD Code:  R60.0 - Localized edema


Assessment and Plan


72-year-old man with





Chronic venous stasis versus cellulitis of both lower extremities


Appears to be likely chronic venous stasis which can mimic itself for cellulitis


However currently on IV antibiotic, appreciate  ID recommendations. Plan to 

change to PO abx and poss DC 


Continue Ace wrap, elevate lower extremities





Dyspnea


Chest x-ray with evidence of cardiomegaly


2D echo pending and check BNP





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


Continue with Xarelto





Questionable history of CHF


Currently on Lasix twice daily, ordered by admitting doctor


2D echo pending and check BNP





Benign labile hypertension


Start Procardia XL





Diarrhea - Improving


C. difficile PCR negative


Treat with antidiarrhea motility agents





DVT prophylaxis: Xarelto





Discussed with the patient, nurse 





Plan to change to PO abx and poss DC, awaiting final recs for DC from ID. 


.











Rabia Covington MD Jun 11, 2018 11:27

## 2018-06-11 NOTE — HHI.DS
__________________________________________________





Discharge Summary


Admission Date


Jun 9, 2018 at 19:02


Discharge Date:  Jun 11, 2018


Admitting Diagnosis





Lower extremity cellulitis, chronic venous stasis, DVT





(1) Cellulitis of both lower extremities


ICD Code:  L03.115 - Cellulitis of right lower limb; L03.116 - Cellulitis of 

left lower limb


(2) Dyspnea on exertion


ICD Code:  R06.09 - Dyspnea on exertion


Status:  Acute


(3) Bilateral leg edema


ICD Code:  R60.0 - Localized edema


Procedures


none


Brief History - From Admission


This is a 62-year-old male with past medical history as stated below with 

morbid obesity, diabetes type 2, history of DVT and PE who presented to [facility] emergency department via EMS for bilateral lower extremity edema 

and inability to ambulate.  The patient states that he has had increased edema 

for the past week, denies any history of chronic venous insufficiency.  Patient 

states that he has had increased redness, swelling, erythema and warmth in 

bilateral lower extremities but more pronounced in the right lower extremity.  

The patient states he is currently homeless and has no family in the area.  The 

patient denies chest pain however he does complain of shortness of breath on 

exertion.  Patient has been complaining of intermittent episodes of diarrhea in 

the last 3 days.  States he was prescribed antibiotics for his legs 4 weeks ago 

and he finished taking antibiotics for which he does not remember the name 2 

weeks ago.  He denies any fevers, chills, sweats, cough.


CBC/BMP:  


6/10/18 0508                                                                   

             6/10/18 0508





Significant Findings





Laboratory Tests








Test


  6/9/18


13:30 6/9/18


20:32 6/9/18


23:20 6/10/18


05:08


 


Red Blood Count


  4.38 MIL/MM3


(4.50-5.90) 


  


  4.27 MIL/MM3


(4.50-5.90)


 


Hematocrit


  38.0 %


(39.0-51.0) 


  


  37.4 %


(39.0-51.0)


 


Neutrophils (%) (Auto)


  72.1 %


(16.0-70.0) 


  


  


 


 


Albumin


  2.9 GM/DL


(3.4-5.0) 


  


  2.7 GM/DL


(3.4-5.0)


 


Calcium Level


  8.3 MG/DL


(8.5-10.1) 


  


  8.2 MG/DL


(8.5-10.1)


 


Potassium Level


  5.2 MEQ/L


(3.5-5.1) 


  


  


 


 


Chloride Level


  110 MEQ/L


() 


  


  109 MEQ/L


()


 


Urine Mucus  MOD /lpf (OCC)   


 


Hemoglobin


  


  


  


  12.3 GM/DL


(13.0-17.0)


 


Red Cell Distribution Width


  


  


  


  17.6 %


(11.6-17.2)


 


Monocytes (%) (Auto)


  


  


  


  8.6 %


(0.0-8.0)


 


Eosinophils (%) (Auto)


  


  


  


  6.1 %


(0.0-4.0)


 


Random Glucose


  


  


  


  71 MG/DL


()


 


Aspartate Amino Transf


(AST/SGOT) 


  


  


  13 U/L (15-37) 


 


 


Estimat Glomerular Filtration


Rate 


  


  


  79 ML/MIN


(>89)


 


B-Type Natriuretic Peptide


  


  


  


  120 PG/ML


(0-100)








Imaging





Last Impressions








Chest X-Ray 6/9/18 0000 Signed





Impressions: 





 CONCLUSION: 





 Mild basilar atelectasis. Cardiomegaly.





  





 








PE at Discharge


GENERAL: NAD


SKIN: Warm and dry.


HEAD: Normocephalic.


EYES: No scleral icterus. No injection or drainage. 


NECK: Supple, trachea midline. No JVD or lymphadenopathy.


CARDIOVASCULAR: Regular rate and rhythm without murmurs, gallops, or rubs. 


RESPIRATORY: Breath sounds equal bilaterally. No accessory muscle use.


GASTROINTESTINAL: Abdomen soft, non-tender, nondistended. 


MUSCULOSKELETAL: No cyanosis; chronic venous stasis with some erythema


BACK: Nontender without obvious deformity. No CVA tenderness.





Hospital Course


72-year-old man with





Chronic venous stasis versus cellulitis of both lower extremities


Appears to be likely chronic venous stasis which can mimic itself for cellulitis


However currently on IV antibiotic, appreciate  ID recommendations. Plan to 

change to PO abx and poss DC 


Continue Ace wrap, elevate lower extremities





Dyspnea


Chest x-ray with evidence of cardiomegaly


2D echo pending and check BNP





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


Continue with Xarelto





Questionable history of CHF


Currently on Lasix twice daily, ordered by admitting doctor


2D echo pending and check BNP





Benign labile hypertension


Start Procardia XL





Diarrhea - Improving


C. difficile PCR negative


Treat with antidiarrhea motility agents





DVT prophylaxis: Xarelto





Discussed with the patient, nurse 





Cleared by ID recommends keflex 500 g po QID for 10 days


Patient improving. DC home in stable condition to follow up as [facility] PCP and 

consultants.


Pt Condition on Discharge:  Stable


Discharge Disposition:  Discharge Home


Discharge Time:  > 30 minutes


Discharge Instructions


DIET: Follow Instructions for:  Heart Healthy Diet, Diabetic Diet


Activities you can perform:  Regular-No Restrictions


Follow up Referrals:  


PCP Follow-up - 2-3 Days


PCP Follow-up





New Medications:  


Cephalexin (Keflex) 500 Mg Cap


500 MG PO Q6H for Infection for 10 Days, #40 CAP 0 Refills





Lactobacillus Acidophilus (Acidophilus/l-Sporogenes) 35 Million Cell-25 Million 

Cell Tab


1 TAB PO Q12HR for Nutritional Supplement, #30 TAB





 


Continued Medications:  


Alprazolam (Xanax) 2 Mg Tab


2 MG PO BID PRN for ANXIETY, TAB 0 Refills





Gabapentin (Gabapentin) 800 Mg Tab


800 MG PO TID, #90 TAB 0 Refills





Hydrocodone-Acetaminophen (Hydrocodone-Acetaminophen)  mg Tab


1 TAB PO Q6H PRN for PAIN, #15 TAB 0 Refills (This prescription has been renewed

)





Rivaroxaban (Xarelto) 20 Mg Tab


20 MG PO DAILY for Blood Clot Prevention, TAB 0 Refills





Sertraline (Sertraline) 100 Mg Tab


100 MG PO DAILY, #30 TAB 0 Refills

















Rabia Covington MD Jun 11, 2018 10:54

## 2018-06-11 NOTE — ECHRPT
Indication:   SOB 

 

 CONCLUSIONS 

 Normal left ventricular size.  

 Wall thickness is normal.  

 The left ventricular systolic function is low normal with an estimated ejection fraction in the rang
e of 50%.  

 Slight aortic valve sclerosis is present.  

 Trace aortic valve regurgitation.  

 Trivial pulmonary valve regurgitation.  

 

 BP:        /         HR:                          Rhythm: 

 

 MEASUREMENTS  (Male / Female) Normal Values       Technical Quality: 

 2D ECHO 

 LV Diastolic Diameter PLAX        5.7 cm                4.2 - 5.9 / 3.9 - 5.3 cm 

 LV Systolic Diameter PLAX         4.1 cm                 

 IVS Diastolic Thickness           1.2 cm                0.6 - 1.0 / 0.6 - 0.9 cm 

 LVPW Diastolic Thickness          0.9 cm                0.6 - 1.0 / 0.6 - 0.9 cm 

 LV Relative Wall Thickness        0.4                    

 RV Internal Dim ED PLAX           2.3 cm                 

 LA Systolic Diameter LX           4.0 cm                3.0 - 4.0 / 2.7 - 3.8 cm 

 

 DOPPLER 

 Mitral E Point Velocity           88.4 cm/s              

 Mitral A Point Velocity           106.0 cm/s             

 Mitral E to A Ratio               0.8                    

 TR Peak Velocity                  243.0 cm/s             

 TR Peak Gradient                  23.6 mmHg              

 Right Atrial Pressure             5.0 mmHg               

 Pulmonary Artery Systolic Pressu  28.6 mmHg              

 Right Ventricular Systolic Press  28.6 mmHg              

 

 

 FINDINGS 

 

 LEFT VENTRICLE 

 Normal left ventricular size.  

 Wall thickness is normal.  

 The left ventricular systolic function is low normal with an estimated ejection fraction in the rang
e of 50%.  

 

 RIGHT VENTRICLE 

 Normal right ventricular size and systolic function.   

 

 LEFT ATRIUM 

 The left atrial size is normal.   

 

 RIGHT ATRIUM 

 The right atrial size is normal.   

 

 ATRIAL SEPTUM 

 Normal atrial septal thickness without atrial level shunting by limited color doppler interrogation.
   

 

 AORTA 

 The aortic root and proximal ascending aorta are normal in size on limited imaging.   

 

 MITRAL VALVE 

 Structurally normal mitral valve.  

 

 

 AORTIC VALVE 

 Slight aortic valve sclerosis is present.  

 Trace aortic valve regurgitation.  

 

 TRICUSPID VALVE 

 Structurally normal tricuspid valve. No tricuspid valve stenosis or regurgitation.   

 

 PULMONARY VALVE 

 Trivial pulmonary valve regurgitation.  

 

 VESSELS 

 The inferior vena cava is normal in size.   

 

 PERICARDIUM 

 No pericardial effusion.   

 

 

 

 

  Ed Aparicio MD 

  (Electronically Signed) 

  Final Date:11 June 2018 17:21

## 2018-06-11 NOTE — HHI.IDPN
Subjective


Subjective


Remarks


co  BLE pain


no fever


Antibiotics


ancef


Allergies:  


Coded Allergies:  


     Fish Containing Products (Unverified  Allergy, Severe, Anaphylaxis, 6/9/18)


     metformin (Unverified  Allergy, Severe, 6/9/18)


 sts shuts kidneys down


     pregabalin (Unverified  Allergy, Severe, 6/9/18)


 shuts kidneys down


     tramadol (Unverified  Allergy, Severe, 6/9/18)


 itch and thoat tightens up


     *MDRO Multi-Drug Resistant Organism (Verified  Adverse Reaction, Unknown, 6 /9/18)


 MRSA PCR Screen negative 4/22/15.





Objective


.





Vital Signs








  Date Time  Temp Pulse Resp B/P (MAP) Pulse Ox O2 Delivery O2 Flow Rate FiO2


 


6/11/18 12:00 98.0 71 18 122/87 (99) 95   


 


6/11/18 08:00 99.4 74 18 144/77 (99) 93   


 


6/11/18 07:00      Room Air  


 


6/11/18 04:00 98.3 71 19 118/78 (91) 95   


 


6/11/18 04:00      Room Air  


 


6/11/18 00:00      Room Air  


 


6/11/18 00:00 98.1 67 19 130/70 (90) 94   


 


6/10/18 23:42   18     


 


6/10/18 21:00      Room Air  


 


6/10/18 20:00 98.8 73 17 116/57 (76) 94   








.





Laboratory Tests








Test


  6/10/18


05:08


 


White Blood Count 5.8 TH/MM3 


 


Red Blood Count 4.27 MIL/MM3 


 


Hemoglobin 12.3 GM/DL 


 


Hematocrit 37.4 % 


 


Mean Corpuscular Volume 87.7 FL 


 


Mean Corpuscular Hemoglobin 28.8 PG 


 


Mean Corpuscular Hemoglobin


Concent 32.9 % 


 


 


Red Cell Distribution Width 17.6 % 


 


Platelet Count 172 TH/MM3 


 


Mean Platelet Volume 7.7 FL 


 


Neutrophils (%) (Auto) 56.3 % 


 


Lymphocytes (%) (Auto) 28.4 % 


 


Monocytes (%) (Auto) 8.6 % 


 


Eosinophils (%) (Auto) 6.1 % 


 


Basophils (%) (Auto) 0.6 % 


 


Neutrophils # (Auto) 3.3 TH/MM3 


 


Lymphocytes # (Auto) 1.7 TH/MM3 


 


Monocytes # (Auto) 0.5 TH/MM3 


 


Eosinophils # (Auto) 0.4 TH/MM3 


 


Basophils # (Auto) 0.0 TH/MM3 


 


CBC Comment DIFF FINAL 


 


Differential Comment  








Laboratory Tests








Test


  6/10/18


05:08


 


Blood Urea Nitrogen 14 MG/DL 


 


Creatinine 0.97 MG/DL 


 


Random Glucose 71 MG/DL 


 


Total Protein 6.5 GM/DL 


 


Albumin 2.7 GM/DL 


 


Calcium Level 8.2 MG/DL 


 


Alkaline Phosphatase 65 U/L 


 


Aspartate Amino Transf


(AST/SGOT) 13 U/L 


 


 


Alanine Aminotransferase


(ALT/SGPT) 14 U/L 


 


 


Total Bilirubin 0.5 MG/DL 


 


Sodium Level 142 MEQ/L 


 


Potassium Level 4.1 MEQ/L 


 


Chloride Level 109 MEQ/L 


 


Carbon Dioxide Level 26.3 MEQ/L 


 


Anion Gap 7 MEQ/L 


 


Estimat Glomerular Filtration


Rate 79 ML/MIN 


 


 


B-Type Natriuretic Peptide 120 PG/ML 








Microbiology








 Date/Time


Source Procedure


Growth Status


 


 


 6/9/18 13:30


Blood Peripheral Aerobic Blood Culture - Preliminary


NO GROWTH IN 2 DAYS Resulted


 


 6/9/18 13:30


Blood Peripheral Anaerobic Blood Culture - Preliminary


NO GROWTH IN 2 DAYS Resulted





 6/9/18 13:20


Blood Peripheral Aerobic Blood Culture - Preliminary


NO GROWTH IN 2 DAYS Resulted


 


 6/9/18 13:20


Blood Peripheral Anaerobic Blood Culture - Preliminary


NO GROWTH IN 2 DAYS Resulted








Imaging





Last Impressions








Chest X-Ray 6/9/18 0000 Signed





Impressions: 





 CONCLUSION: 





 Mild basilar atelectasis. Cardiomegaly.





  





 








Physical Exam


CONSTITUTIONAL/GENERAL: This is a morbidly obese patient, in no apparent 

distress.


TUBES/LINES/DRAINS:


SKIN: No jaundice, rashes, or lesions. Ecchymoses on upper extremities. No 

wounds seen anteriorly. Skin temperature appropriate. Not diaphoretic. 


 CARDIOVASCULAR: Regular rate and rhythm without murmurs, gallops, or rubs. No 

JVD. Peripheral pulses symmetric.


RESPIRATORY/CHEST: Symmetric, unlabored respirations. Clear to auscultation. 

Breath sounds equal bilaterally. No wheezes, rales, or rhonchi.  


GASTROINTESTINAL: Abdomen soft, non-tender, nondistended. No hepato-splenomegaly

, or palpable masses. No guarding. Bowel sounds present.


 MUSCULOSKELETAL: Extremities without clubbing, cyanosis, 


2+ edema., though tree bark marks present cw diminishiunfg swelling


  erythema appears less prominent 


+ chronic hyperpigmentation and hyperkeratosis


 No joint tenderness or effusion noted. No calf tenderness. No mottling or 

clubbing.


NEUROLOGICAL: Awake and alert. Motor and sensory grossly within normal limits. 

Follows commands. Clear speech . Moves all extremities.


PSYCHIATRIC: No obvious anxiety/depression. no apparent hallucinations or other 

psychotic thought process.








Assessment & Plan


Remarks


Morbid obesity


Chronic venostasis


BLE cellulitis





   - change Ancef to Keflex 500 mg PO QID to complete 10 more days


Keep BLE elevated


 OK to dc











Roberta Brar MD Jun 11, 2018 16:46

## 2021-09-15 NOTE — PD
HPI


Chief Complaint:  Edema


Time Seen by Provider:  13:09


Travel History


International Travel<30 days:  No


Contact w/Intl Traveler<30days:  No


Traveled to known affect area:  No





History of Present Illness


HPI


The patient is a 62-year-old  male who presents to the emergency 

department via EMS for bilateral lower extremity edema.  Per EMS the patient 

called and stated he had edema for the past 4 weeks the lower extremities, 

however, upon arrival the patient states he has had increase in edema for the 

last 3 days.  He does note a history of chronic venous insufficiency.  The 

patient states she has mild redness to the left leg, has had increasing 

swelling with difficulty ambulating.  The patient states he is currently 

homeless and has no family in the local area.  The patient's symptoms are 

moderate.  He denies any significant chest pain or shortness of breath.  He 

does note a few intermittent episodes of diarrhea over the last 3 days.  He 

denies any associated fever, chills, or sweats.





PFSH


Past Medical History


Hx Anticoagulant Therapy:  Yes (xarelto )


Arthritis:  Yes


Bipolar Disorder:  Yes


Anxiety:  Yes


Depression:  Yes


Cardiovascular Problems:  Yes


COPD:  Yes


Diabetes:  Yes


Diminished Hearing:  Yes (DIMINISHED RIGHT EAR)


Deep Vein Thrombosis:  Yes


Endocrine:  Yes


Gastrointestinal Disorders:  Yes (HERNIA)


Genitourinary:  No


Hypertension:  Yes


Inguinal Hernia:  Yes


Musculoskeletal:  Yes


Neurologic:  Yes (NEUROPATHY)


Psychiatric:  Yes (depression/ptsd)


Reproductive:  No


Respiratory:  Yes (COPD )


Integumentary:  Yes (CELLULITIS BILATERALLY)


Immunizations Current:  Yes


Pregnant?:  Not Pregnant





Past Surgical History


Abdominal Surgery:  Yes (UMBILICAL HERNIA REPAIR)


Cholecystectomy:  Yes


Tonsillectomy:  Yes


Other Surgery:  Yes (LEFT CLAVICAL CALCIUM BUILD UP REMOVAL)





Social History


Alcohol Use:  No


Tobacco Use:  No (quit 1988)


Substance Use:  No





Allergies-Medications


(Allergen,Severity, Reaction):  


Coded Allergies:  


     Fish Containing Products (Unverified  Allergy, Severe, Anaphylaxis, 6/9/18)


     metformin (Unverified  Allergy, Severe, 6/9/18)


 sts shuts kidneys down


     pregabalin (Unverified  Allergy, Severe, 6/9/18)


 shuts kidneys down


     tramadol (Unverified  Allergy, Severe, 6/9/18)


 itch and thoat tightens up


     *MDRO Multi-Drug Resistant Organism (Verified  Adverse Reaction, Unknown, 6 /9/18)


 MRSA PCR Screen negative 4/22/15.


Reported Meds & Prescriptions





Reported Meds & Active Scripts


Active


Bactrim DS (Sulfamethoxazole-Trimethoprim) 800-160 Mg Tab 1 Tab PO BID








Review of Systems


Except as stated in HPI:  all other systems reviewed are Neg


General / Constitutional:  No: Fever


Cardiovascular:  No: Chest Pain or Discomfort


Respiratory:  No: Shortness of Breath


Gastrointestinal:  Positive: Diarrhea, No: Nausea, Vomiting


Musculoskeletal:  Positive: Edema, Pain


Skin:  Positive Other (Chronic venous stasis changes lower extremities with 

mild erythema of the left lower extremity)


Neurologic:  No: Dizziness





Physical Exam


Narrative


GENERAL: Awake, alert, pleasant 8-year-old male who appears his stated age is 

in no acute respiratory distress.


SKIN: Focused skin assessment warm/dry.


HEAD: Atraumatic. Normocephalic. 


EYES: No injection or drainage.


ENT: No nasal bleeding or discharge.  Mucous membranes pink and moist.


NECK: Trachea midline. No JVD. 


CARDIOVASCULAR: Regular rate and rhythm.  No murmur appreciated.


RESPIRATORY: No accessory muscle use. Clear to auscultation. Breath sounds 

equal bilaterally. 


GASTROINTESTINAL: Abdomen soft, obese, large pannus, when the pannus is pulled 

up right, there is fungal infection noted underlying the skin that is overlying 

the lower abdomen.


MUSCULOSKELETAL: Bilateral lower extremity edema with chronic venous stasis 

changes noted in bronzing of the skin.  Mild erythema of the left lower 

extremity just.  Positive distal pulses.


NEUROLOGICAL: Awake and alert. No obvious cranial nerve deficits.  Motor 

grossly within normal limits. Normal speech.  Nonfocal.


PSYCHIATRIC: Appropriate mood and affect; insight and judgment normal.





Data


Data


Last Documented VS





Vital Signs








  Date Time  Temp Pulse Resp B/P (MAP) Pulse Ox O2 Delivery O2 Flow Rate FiO2


 


6/9/18 13:04 98.3 83 16 141/76 (97) 98   








Orders





 Orders


Complete Blood Count With Diff (6/9/18 13:14)


Comprehensive Metabolic Panel (6/9/18 13:14)


Lactic Acid (6/9/18 13:14)


Blood Culture (6/9/18 13:14)


Morphine Inj (Morphine Inj) (6/9/18 13:15)


Ondansetron  Odt (Zofran  Odt) (6/9/18 13:15)


Sulfamet-Trimeth Ds 800-160 Mg (Bactrim (6/9/18 13:15)


Ace Bandage (6/9/18 14:42)


Admit Order (Ed Use Only) (6/9/18 18:07)


Cefazolin 2 Gm Premix (Ancef 2 Gm Premix (6/9/18 18:15)





Labs





Laboratory Tests








Test


  6/9/18


13:30


 


White Blood Count 7.1 TH/MM3 


 


Red Blood Count 4.38 MIL/MM3 


 


Hemoglobin 13.2 GM/DL 


 


Hematocrit 38.0 % 


 


Mean Corpuscular Volume 86.8 FL 


 


Mean Corpuscular Hemoglobin 30.1 PG 


 


Mean Corpuscular Hemoglobin


Concent 34.7 % 


 


 


Red Cell Distribution Width 17.2 % 


 


Platelet Count 176 TH/MM3 


 


Mean Platelet Volume 8.1 FL 


 


Neutrophils (%) (Auto) 72.1 % 


 


Lymphocytes (%) (Auto) 17.1 % 


 


Monocytes (%) (Auto) 6.6 % 


 


Eosinophils (%) (Auto) 3.6 % 


 


Basophils (%) (Auto) 0.6 % 


 


Neutrophils # (Auto) 5.1 TH/MM3 


 


Lymphocytes # (Auto) 1.2 TH/MM3 


 


Monocytes # (Auto) 0.5 TH/MM3 


 


Eosinophils # (Auto) 0.3 TH/MM3 


 


Basophils # (Auto) 0.0 TH/MM3 


 


CBC Comment DIFF FINAL 


 


Differential Comment  


 


Blood Urea Nitrogen 17 MG/DL 


 


Creatinine 0.87 MG/DL 


 


Random Glucose 81 MG/DL 


 


Total Protein 7.3 GM/DL 


 


Albumin 2.9 GM/DL 


 


Calcium Level 8.3 MG/DL 


 


Alkaline Phosphatase 72 U/L 


 


Aspartate Amino Transf


(AST/SGOT) 35 U/L 


 


 


Alanine Aminotransferase


(ALT/SGPT) 16 U/L 


 


 


Total Bilirubin 0.4 MG/DL 


 


Sodium Level 140 MEQ/L 


 


Potassium Level 5.2 MEQ/L 


 


Chloride Level 110 MEQ/L 


 


Carbon Dioxide Level 22.1 MEQ/L 


 


Anion Gap 8 MEQ/L 


 


Estimat Glomerular Filtration


Rate 90 ML/MIN 


 


 


Lactic Acid Level 1.3 mmol/L 











MDM


Medical Decision Making


Medical Screen Exam Complete:  Yes


Emergency Medical Condition:  Yes


Medical Record Reviewed:  Yes


Interpretation(s)





Laboratory Tests








Test


  6/9/18


13:30


 


White Blood Count 7.1 TH/MM3 


 


Red Blood Count 4.38 MIL/MM3 


 


Hemoglobin 13.2 GM/DL 


 


Hematocrit 38.0 % 


 


Mean Corpuscular Volume 86.8 FL 


 


Mean Corpuscular Hemoglobin 30.1 PG 


 


Mean Corpuscular Hemoglobin


Concent 34.7 % 


 


 


Red Cell Distribution Width 17.2 % 


 


Platelet Count 176 TH/MM3 


 


Mean Platelet Volume 8.1 FL 


 


Neutrophils (%) (Auto) 72.1 % 


 


Lymphocytes (%) (Auto) 17.1 % 


 


Monocytes (%) (Auto) 6.6 % 


 


Eosinophils (%) (Auto) 3.6 % 


 


Basophils (%) (Auto) 0.6 % 


 


Neutrophils # (Auto) 5.1 TH/MM3 


 


Lymphocytes # (Auto) 1.2 TH/MM3 


 


Monocytes # (Auto) 0.5 TH/MM3 


 


Eosinophils # (Auto) 0.3 TH/MM3 


 


Basophils # (Auto) 0.0 TH/MM3 


 


CBC Comment DIFF FINAL 


 


Differential Comment  


 


Blood Urea Nitrogen 17 MG/DL 


 


Creatinine 0.87 MG/DL 


 


Random Glucose 81 MG/DL 


 


Total Protein 7.3 GM/DL 


 


Albumin 2.9 GM/DL 


 


Calcium Level 8.3 MG/DL 


 


Alkaline Phosphatase 72 U/L 


 


Aspartate Amino Transf


(AST/SGOT) 35 U/L 


 


 


Alanine Aminotransferase


(ALT/SGPT) 16 U/L 


 


 


Total Bilirubin 0.4 MG/DL 


 


Sodium Level 140 MEQ/L 


 


Potassium Level 5.2 MEQ/L 


 


Chloride Level 110 MEQ/L 


 


Carbon Dioxide Level 22.1 MEQ/L 


 


Anion Gap 8 MEQ/L 


 


Estimat Glomerular Filtration


Rate 90 ML/MIN 


 


 


Lactic Acid Level 1.3 mmol/L 








Differential Diagnosis


Differential diagnosis includes chronic venous stasis, cellulitis, DVT, 

peripheral vascular disease, hypoalbuminemia, chronic lymphedema, chronic leg 

pain.


Narrative Course


IV was established, labs are drawn and sent, the patient was placed on cardiac 

telemetry monitoring and continuous pulse oximetry monitoring.  The patient was 

administered morphine and Zofran for his discomfort.  The patient was 

administered Bactrim p.o. the patient's white count and lactic acid are within 

normal limits.  Potassium is slightly elevated at 5.2, however, there was 

moderate hemolysis.  The patient's vitals are stable.  The patient's legs will 

be wrapped with an Ace wrap, patient will be placed on Bactrim.  He is advised 

to elevate his legs and follow-up with a primary physician.  The patient was 

administered his Xarelto for chronic DVT that was diagnosed in April, his last 

dose of Xarelto was yesterday.  The patient had poor ambulatory status even 

with a wheeled walker and assistance at bedside.  Case management evaluated the 

patient, they called to nursing homes, however, have not heard back.  They may 

not hear back till Monday.  Case management recommends 23 hours of observation 

to the on-call medical service to obtain placement possibly on Monday.  The 

patient is agreeable to this and states he has a Social Security check they can 

help pay for his stay at the facility.  Therefore, the on-call medical team was 

paged for observation.





Physician Communication


Physician Communication


The on-call medical team was paged for 23-hour observation.  I discussed the 

patient with Dr. Munoz who agrees with 23 hour observation.





Diagnosis





 Primary Impression:  


 Cellulitis of leg


 Qualified Codes:  L03.116 - Cellulitis of left lower limb


 Additional Impressions:  


 Venous stasis dermatitis of both lower extremities


 DVT (deep venous thrombosis)


 Qualified Codes:  I82.509 - Chronic embolism and thrombosis of unspecified 

deep veins of unspecified lower extremity





Admitting Information


Admitting Physician Requests:  Observation


Scripts


Sulfamethoxazole-Trimethoprim (Bactrim DS) 800-160 Mg Tab


1 TAB PO BID for Infection, #14 TAB 0 Refills


   Prov: Gomez Montero MD         6/9/18


Condition:  Stable











Gomez Montero MD Jun 9, 2018 13:23 oral